# Patient Record
Sex: MALE | Race: BLACK OR AFRICAN AMERICAN | NOT HISPANIC OR LATINO | Employment: FULL TIME | URBAN - NONMETROPOLITAN AREA
[De-identification: names, ages, dates, MRNs, and addresses within clinical notes are randomized per-mention and may not be internally consistent; named-entity substitution may affect disease eponyms.]

---

## 2022-06-24 ENCOUNTER — APPOINTMENT (OUTPATIENT)
Dept: GENERAL RADIOLOGY | Facility: HOSPITAL | Age: 32
End: 2022-06-24

## 2022-06-24 ENCOUNTER — HOSPITAL ENCOUNTER (EMERGENCY)
Facility: HOSPITAL | Age: 32
Discharge: HOME | End: 2022-06-24
Attending: EMERGENCY MEDICINE | Admitting: EMERGENCY MEDICINE

## 2022-06-24 VITALS
BODY MASS INDEX: 26.82 KG/M2 | SYSTOLIC BLOOD PRESSURE: 133 MMHG | DIASTOLIC BLOOD PRESSURE: 91 MMHG | WEIGHT: 198 LBS | TEMPERATURE: 97.7 F | RESPIRATION RATE: 16 BRPM | HEIGHT: 72 IN | HEART RATE: 82 BPM | OXYGEN SATURATION: 98 %

## 2022-06-24 DIAGNOSIS — S90.32XA CONTUSION OF LEFT FOOT, INITIAL ENCOUNTER: Primary | ICD-10-CM

## 2022-06-24 PROCEDURE — 73610 X-RAY EXAM OF ANKLE: CPT

## 2022-06-24 PROCEDURE — 73630 X-RAY EXAM OF FOOT: CPT

## 2022-06-24 PROCEDURE — 99283 EMERGENCY DEPT VISIT LOW MDM: CPT

## 2022-06-24 RX ORDER — IBUPROFEN 400 MG/1
400 TABLET ORAL ONCE
Status: COMPLETED | OUTPATIENT
Start: 2022-06-24 | End: 2022-06-24

## 2022-06-24 RX ADMIN — IBUPROFEN 400 MG: 400 TABLET, FILM COATED ORAL at 10:56

## 2022-06-24 NOTE — ED NOTES
Pt in bed. No s/s distress noted. Denies needs. Asmt completed. Pt states he hit to top of his L foot 3 weeks ago and slight swelling noted. States pain started last week and into his L ankle. Denies any other known injury. CMS intact. Ambulatory but still some pain. No swelling noted at this time. POC discussed. Verbalized understanding. Will cont monitoring. Call light in reach. Advised to call any needs.

## 2022-06-24 NOTE — ED PROVIDER NOTES
Subjective   Patient is a 31-year-old male who presents to the ER with left foot/ankle pain.  Patient states that 3 weeks ago he got out of the bed and bumped his left foot/ankle on a chair that then caused him to fall to the ground.  He did not hit his head or have any loss of consciousness.  Patient had some mild edema on the proximal aspect of his left foot as well as pain in this location.  Patient states the swelling resolved but he continues to have pain in that region.  Pain is worse with ambulating.  He denies any fever, chest pain, shortness of air, abdominal pain, nausea vomiting diarrhea, urinary changes, neurologic changes, neck or back pain, other extremity pain.          Review of Systems   Constitutional: Negative.    HENT: Negative.    Eyes: Negative.    Respiratory: Negative.    Cardiovascular: Negative.    Gastrointestinal: Negative.    Endocrine: Negative.    Genitourinary: Negative.    Musculoskeletal: Positive for arthralgias and myalgias.   Skin: Negative.    Allergic/Immunologic: Negative.    Neurological: Negative.    Hematological: Negative.    Psychiatric/Behavioral: Negative.    All other systems reviewed and are negative.      History reviewed. No pertinent past medical history.    No Known Allergies    History reviewed. No pertinent surgical history.    History reviewed. No pertinent family history.    Social History     Socioeconomic History   • Marital status:    Tobacco Use   • Smoking status: Never Smoker   • Smokeless tobacco: Never Used   Substance and Sexual Activity   • Alcohol use: Never   • Drug use: Never           Objective   Physical Exam  Vitals and nursing note reviewed.   Constitutional:       Appearance: He is well-developed.   HENT:      Head: Normocephalic and atraumatic.   Eyes:      Conjunctiva/sclera: Conjunctivae normal.      Pupils: Pupils are equal, round, and reactive to light.   Cardiovascular:      Rate and Rhythm: Normal rate and regular rhythm.       Heart sounds: Normal heart sounds.   Pulmonary:      Effort: Pulmonary effort is normal.      Breath sounds: Normal breath sounds.   Abdominal:      Palpations: Abdomen is soft.      Tenderness: There is no abdominal tenderness.   Musculoskeletal:         General: No deformity. Normal range of motion.      Cervical back: Normal range of motion.      Comments: Tenderness to palpation to the proximal aspect of the left dorsal foot/anterior ankle, nontender palpation elsewhere, no edema, no calf tenderness or edema, normal range of motion, neurovascular intact, pulses 2+   Skin:     General: Skin is warm.   Neurological:      Mental Status: He is alert and oriented to person, place, and time.      Sensory: Sensation is intact.      Motor: Motor function is intact.   Psychiatric:         Behavior: Behavior normal.         Procedures           ED Course         XR Foot 3+ View Left   Final Result   1. No acute bony abnormality in the LEFT foot.   This report was finalized on 06/24/2022 10:35 by Dr. Alex Islas MD.      XR Ankle 3+ View Left   Final Result   1. Unremarkable radiographs of the left ankle.            This report was finalized on 06/24/2022 10:33 by Dr. Alex Islas MD.        Patient was given Motrin.  X-rays of the left ankle and foot were negative.  Workup consistent with a left foot/ankle contusion.  Patient was advised rest ice and elevation.  He was advised NSAIDs.  He was given an ankle air splint and was placed by nursing.  Patient was neurovascular intact after placement.  Patient did not need crutches.  He will be discharged home to follow-up with his PCP and orthopedic surgery.  He may need an outpatient MRI if symptoms persist.  He is to return for any worse or new pain or other concerns.  Patient agreeable.                                     MDM    Final diagnoses:   Contusion of left foot, initial encounter       ED Disposition  ED Disposition     ED Disposition   Discharge    Condition    Stable    Comment   --             Diego Rodríguez MD  [address] [place], [place] [id number]  [phone]    Schedule an appointment as soon as possible for a visit            Medication List      No changes were made to your prescriptions during this visit.          Marcella Gilbert MD  06/24/22 1047